# Patient Record
Sex: FEMALE | Race: WHITE | NOT HISPANIC OR LATINO | URBAN - METROPOLITAN AREA
[De-identification: names, ages, dates, MRNs, and addresses within clinical notes are randomized per-mention and may not be internally consistent; named-entity substitution may affect disease eponyms.]

---

## 2024-08-30 ENCOUNTER — HOSPITAL ENCOUNTER (OUTPATIENT)
Dept: RADIOLOGY | Facility: EXTERNAL LOCATION | Age: 29
Discharge: HOME | End: 2024-08-30

## 2024-08-30 DIAGNOSIS — M79.642 LEFT HAND PAIN: ICD-10-CM

## 2024-10-19 ENCOUNTER — OFFICE VISIT (OUTPATIENT)
Dept: URGENT CARE | Age: 29
End: 2024-10-19
Payer: COMMERCIAL

## 2024-10-19 VITALS
HEART RATE: 61 BPM | RESPIRATION RATE: 20 BRPM | OXYGEN SATURATION: 98 % | SYSTOLIC BLOOD PRESSURE: 125 MMHG | TEMPERATURE: 98.1 F | DIASTOLIC BLOOD PRESSURE: 71 MMHG

## 2024-10-19 DIAGNOSIS — R21 RASH AND NONSPECIFIC SKIN ERUPTION: Primary | ICD-10-CM

## 2024-10-19 ASSESSMENT — ENCOUNTER SYMPTOMS: CONSTITUTIONAL NEGATIVE: 1

## 2024-10-19 NOTE — PATIENT INSTRUCTIONS
Take zyrtec daily, may take benadryl at night as needed for itching    Use Hydrocortisone cream twice daily to affected area    Watch for new rash locations or other symptoms

## 2024-10-19 NOTE — PROGRESS NOTES
Subjective   Patient ID: Camilo Burger is a 29 y.o. female. They present today with a chief complaint of Rash (Noticed rash to bilat wrists this AM).    History of Present Illness  29-year-old female presents to clinic today with complaints of a rash to bilateral wrist.  Patient states she noticed it this morning.  She states it is slightly itchy.  She states it has improved since this morning.  She denies any change in medications, detergents, clothing etc.  She states she was working with some plants the other day but nothing new.  She has not taken anything for symptoms.      Rash        Past Medical History  Allergies as of 10/19/2024    (No Known Allergies)       (Not in a hospital admission)       No past medical history on file.    No past surgical history on file.     reports that she has never smoked. She has never used smokeless tobacco. She reports that she does not use drugs.    Review of Systems  Review of Systems   Constitutional: Negative.    Skin:  Positive for rash.                                  Objective    Vitals:    10/19/24 0927   BP: 125/71   Pulse: 61   Resp: 20   Temp: 36.7 °C (98.1 °F)   SpO2: 98%     No LMP recorded.    Physical Exam  Constitutional:       Appearance: Normal appearance.   Skin:     Comments: Fine erythematous papular rash to bilateral wrists   Neurological:      Mental Status: She is alert.         Procedures    Point of Care Test & Imaging Results from this visit  No results found for this visit on 10/19/24.   No results found.    Diagnostic study results (if any) were reviewed by Prakash Fernandez PA-C.    Assessment/Plan   Allergies, medications, history, and pertinent labs/EKGs/Imaging reviewed by Prakash Fernandez PA-C.     Medical Decision Making  On examination there is a fine rash to bilateral wrist.  This does look like a contact dermatitis.  I advised patient to use over-the-counter hydrocortisone cream along with a daily antihistamine medication.  Symptoms  should continue to improve.    Orders and Diagnoses  Diagnoses and all orders for this visit:  Rash and nonspecific skin eruption      Medical Admin Record      Patient disposition: Home    Electronically signed by Prakash Fernandez PA-C  11:09 AM

## 2024-10-27 ENCOUNTER — OFFICE VISIT (OUTPATIENT)
Dept: URGENT CARE | Age: 29
End: 2024-10-27
Payer: COMMERCIAL

## 2024-10-27 VITALS
SYSTOLIC BLOOD PRESSURE: 110 MMHG | OXYGEN SATURATION: 99 % | TEMPERATURE: 99 F | RESPIRATION RATE: 16 BRPM | HEART RATE: 76 BPM | DIASTOLIC BLOOD PRESSURE: 67 MMHG
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
## 2024-10-27 DIAGNOSIS — S80.862A TICK BITE OF LEFT LOWER LEG, INITIAL ENCOUNTER: Primary | ICD-10-CM

## 2024-10-27 PROCEDURE — 99213 OFFICE O/P EST LOW 20 MIN: CPT | Performed by: NURSE PRACTITIONER

## 2024-10-27 NOTE — PROGRESS NOTES
Subjective   History of Present Illness: Camilo Burger is a 29 y.o. female. They present today with a chief complaint of a tick bite on posterior L. Knee x 1 day. Pt states that she was hiking in the woods. She later at night found a tick stuck on the back of her L. Knee. She took it out. It left erythema and pain in the spot that was in.           Past Medical History  Allergies as of 10/27/2024    (No Known Allergies)       (Not in a hospital admission)       No past medical history on file.    No past surgical history on file.     reports that she has never smoked. She has never used smokeless tobacco. She reports that she does not use drugs.    Review of Systems  Review of Systems                               Objective    Vitals:    10/27/24 1144   BP: 110/67   Pulse: 76   Resp: 16   Temp: 37.2 °C (99 °F)   SpO2: 99%     No LMP recorded.    Physical Exam  Vitals reviewed.   HENT:      Head: Normocephalic and atraumatic.      Nose: Nose normal.      Mouth/Throat:      Mouth: Mucous membranes are moist.   Eyes:      Extraocular Movements: Extraocular movements intact.      Conjunctiva/sclera: Conjunctivae normal.      Pupils: Pupils are equal, round, and reactive to light.   Cardiovascular:      Rate and Rhythm: Normal rate and regular rhythm.   Pulmonary:      Effort: Pulmonary effort is normal.      Breath sounds: Normal breath sounds.   Skin:     General: Skin is warm.      Findings: Lesion present.      Comments: On posterior L. knee   Neurological:      Mental Status: She is alert and oriented to person, place, and time.   Psychiatric:         Mood and Affect: Mood normal.         Behavior: Behavior normal.         Embedded Foreign Body Removal    Date/Time: 10/27/2024 12:07 PM    Performed by: Anni Martinez PA-C  Authorized by: Anni Martinez PA-C    Consent:     Consent obtained:  Verbal    Consent given by:  Patient    Risks discussed:  Infection, bleeding and incomplete removal    Alternatives  discussed:  No treatment  Universal protocol:     Patient identity confirmed:  Verbally with patient  Location:     Location:  Leg    Leg location:  L knee  Anesthesia:     Anesthesia method:  Local infiltration    Local anesthetic:  Lidocaine 1% w/o epi  Procedure details:     Removal mechanism:  Forceps    Intact foreign body removal: no    Post-procedure details:     Neurovascular status: intact      Procedure completion:  Tolerated well, no immediate complications      Point of Care Test & Imaging Results from this visit  No results found for this visit on 10/27/24.   No results found.    Diagnostic study results (if any) were reviewed by Anni Martinez PA-C.    Assessment/Plan   Allergies, medications, history, and pertinent labs/EKGs/Imaging reviewed by Anni Martinez PA-C.     Medical Decision Making  - follow up with pcp if you have concerns for lyme disease.  -         Patient is educated about their diagnoses.     -          Discussed medications benefits and adverse effects.     -          Answered all patient’s questions.     -          Patient will call 911 or go to the nearest ED if worsen symptoms .     -          Patient is agreeable to the plan of care and is deemed stable upon discharge.     -          Follow up with your primary care provider in two days.      Orders and Diagnoses  Diagnoses and all orders for this visit:  Tick bite of left lower leg, initial encounter  Other orders  -     Embedded Foreign Body Removal      Medical Admin Record      Patient disposition: Home    Electronically signed by Anni Martinez PA-C  12:11 PM

## 2024-10-29 ENCOUNTER — OFFICE VISIT (OUTPATIENT)
Dept: URGENT CARE | Age: 29
End: 2024-10-29
Payer: COMMERCIAL

## 2024-10-29 VITALS
OXYGEN SATURATION: 98 % | TEMPERATURE: 97.7 F | HEART RATE: 69 BPM | DIASTOLIC BLOOD PRESSURE: 81 MMHG | SYSTOLIC BLOOD PRESSURE: 122 MMHG
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
## 2024-10-29 PROCEDURE — 99213 OFFICE O/P EST LOW 20 MIN: CPT

## 2024-10-29 PROCEDURE — 1036F TOBACCO NON-USER: CPT

## 2024-10-29 RX ORDER — DOXYCYCLINE 100 MG/1
100 CAPSULE ORAL 2 TIMES DAILY
Qty: 20 CAPSULE | Refills: 0 | Status: SHIPPED | OUTPATIENT
Start: 2024-10-29 | End: 2024-11-08